# Patient Record
Sex: MALE | Race: WHITE | NOT HISPANIC OR LATINO | URBAN - METROPOLITAN AREA
[De-identification: names, ages, dates, MRNs, and addresses within clinical notes are randomized per-mention and may not be internally consistent; named-entity substitution may affect disease eponyms.]

---

## 2019-07-30 ENCOUNTER — EMERGENCY (EMERGENCY)
Facility: HOSPITAL | Age: 59
LOS: 1 days | Discharge: HOME | End: 2019-07-30
Attending: EMERGENCY MEDICINE
Payer: COMMERCIAL

## 2019-07-30 VITALS
WEIGHT: 255.07 LBS | OXYGEN SATURATION: 98 % | RESPIRATION RATE: 22 BRPM | SYSTOLIC BLOOD PRESSURE: 228 MMHG | HEIGHT: 70 IN | DIASTOLIC BLOOD PRESSURE: 117 MMHG | HEART RATE: 83 BPM | TEMPERATURE: 98 F

## 2019-07-30 VITALS
TEMPERATURE: 98 F | HEART RATE: 68 BPM | SYSTOLIC BLOOD PRESSURE: 188 MMHG | RESPIRATION RATE: 19 BRPM | OXYGEN SATURATION: 97 % | DIASTOLIC BLOOD PRESSURE: 84 MMHG

## 2019-07-30 LAB
ANION GAP SERPL CALC-SCNC: 14 MMOL/L — SIGNIFICANT CHANGE UP (ref 5–17)
BASOPHILS # BLD AUTO: 0.06 K/UL — SIGNIFICANT CHANGE UP (ref 0–0.2)
BASOPHILS NFR BLD AUTO: 0.6 % — SIGNIFICANT CHANGE UP (ref 0–2)
BUN SERPL-MCNC: 12 MG/DL — SIGNIFICANT CHANGE UP (ref 8–20)
CALCIUM SERPL-MCNC: 9.8 MG/DL — SIGNIFICANT CHANGE UP (ref 8.6–10.2)
CHLORIDE SERPL-SCNC: 104 MMOL/L — SIGNIFICANT CHANGE UP (ref 98–107)
CO2 SERPL-SCNC: 24 MMOL/L — SIGNIFICANT CHANGE UP (ref 22–29)
CREAT SERPL-MCNC: 0.8 MG/DL — SIGNIFICANT CHANGE UP (ref 0.5–1.3)
EOSINOPHIL # BLD AUTO: 0.26 K/UL — SIGNIFICANT CHANGE UP (ref 0–0.5)
EOSINOPHIL NFR BLD AUTO: 2.5 % — SIGNIFICANT CHANGE UP (ref 0–6)
GLUCOSE SERPL-MCNC: 116 MG/DL — HIGH (ref 70–115)
HCT VFR BLD CALC: 48.9 % — SIGNIFICANT CHANGE UP (ref 39–50)
HGB BLD-MCNC: 16.1 G/DL — SIGNIFICANT CHANGE UP (ref 13–17)
IMM GRANULOCYTES NFR BLD AUTO: 0.3 % — SIGNIFICANT CHANGE UP (ref 0–1.5)
LYMPHOCYTES # BLD AUTO: 1.92 K/UL — SIGNIFICANT CHANGE UP (ref 1–3.3)
LYMPHOCYTES # BLD AUTO: 18.8 % — SIGNIFICANT CHANGE UP (ref 13–44)
MCHC RBC-ENTMCNC: 28.9 PG — SIGNIFICANT CHANGE UP (ref 27–34)
MCHC RBC-ENTMCNC: 32.9 GM/DL — SIGNIFICANT CHANGE UP (ref 32–36)
MCV RBC AUTO: 87.8 FL — SIGNIFICANT CHANGE UP (ref 80–100)
MONOCYTES # BLD AUTO: 0.87 K/UL — SIGNIFICANT CHANGE UP (ref 0–0.9)
MONOCYTES NFR BLD AUTO: 8.5 % — SIGNIFICANT CHANGE UP (ref 2–14)
NEUTROPHILS # BLD AUTO: 7.09 K/UL — SIGNIFICANT CHANGE UP (ref 1.8–7.4)
NEUTROPHILS NFR BLD AUTO: 69.3 % — SIGNIFICANT CHANGE UP (ref 43–77)
PLATELET # BLD AUTO: 327 K/UL — SIGNIFICANT CHANGE UP (ref 150–400)
POTASSIUM SERPL-MCNC: 4.1 MMOL/L — SIGNIFICANT CHANGE UP (ref 3.5–5.3)
POTASSIUM SERPL-SCNC: 4.1 MMOL/L — SIGNIFICANT CHANGE UP (ref 3.5–5.3)
RBC # BLD: 5.57 M/UL — SIGNIFICANT CHANGE UP (ref 4.2–5.8)
RBC # FLD: 12.9 % — SIGNIFICANT CHANGE UP (ref 10.3–14.5)
SODIUM SERPL-SCNC: 142 MMOL/L — SIGNIFICANT CHANGE UP (ref 135–145)
WBC # BLD: 10.23 K/UL — SIGNIFICANT CHANGE UP (ref 3.8–10.5)
WBC # FLD AUTO: 10.23 K/UL — SIGNIFICANT CHANGE UP (ref 3.8–10.5)

## 2019-07-30 PROCEDURE — 96365 THER/PROPH/DIAG IV INF INIT: CPT

## 2019-07-30 PROCEDURE — 99284 EMERGENCY DEPT VISIT MOD MDM: CPT

## 2019-07-30 PROCEDURE — 96375 TX/PRO/DX INJ NEW DRUG ADDON: CPT

## 2019-07-30 PROCEDURE — 85027 COMPLETE CBC AUTOMATED: CPT

## 2019-07-30 PROCEDURE — 90471 IMMUNIZATION ADMIN: CPT

## 2019-07-30 PROCEDURE — 99284 EMERGENCY DEPT VISIT MOD MDM: CPT | Mod: 25

## 2019-07-30 PROCEDURE — 93005 ELECTROCARDIOGRAM TRACING: CPT

## 2019-07-30 PROCEDURE — 36415 COLL VENOUS BLD VENIPUNCTURE: CPT

## 2019-07-30 PROCEDURE — 80048 BASIC METABOLIC PNL TOTAL CA: CPT

## 2019-07-30 PROCEDURE — 73090 X-RAY EXAM OF FOREARM: CPT

## 2019-07-30 PROCEDURE — 73090 X-RAY EXAM OF FOREARM: CPT | Mod: 26,LT

## 2019-07-30 PROCEDURE — 93010 ELECTROCARDIOGRAM REPORT: CPT

## 2019-07-30 PROCEDURE — 90715 TDAP VACCINE 7 YRS/> IM: CPT

## 2019-07-30 RX ORDER — DIPHENHYDRAMINE HCL 50 MG
25 CAPSULE ORAL ONCE
Refills: 0 | Status: COMPLETED | OUTPATIENT
Start: 2019-07-30 | End: 2019-07-30

## 2019-07-30 RX ORDER — CIPROFLOXACIN LACTATE 400MG/40ML
1 VIAL (ML) INTRAVENOUS
Qty: 20 | Refills: 0
Start: 2019-07-30 | End: 2019-08-08

## 2019-07-30 RX ORDER — MORPHINE SULFATE 50 MG/1
8 CAPSULE, EXTENDED RELEASE ORAL ONCE
Refills: 0 | Status: DISCONTINUED | OUTPATIENT
Start: 2019-07-30 | End: 2019-07-30

## 2019-07-30 RX ORDER — LOSARTAN POTASSIUM 100 MG/1
50 TABLET, FILM COATED ORAL DAILY
Refills: 0 | Status: DISCONTINUED | OUTPATIENT
Start: 2019-07-30 | End: 2019-08-09

## 2019-07-30 RX ORDER — TETANUS TOXOID, REDUCED DIPHTHERIA TOXOID AND ACELLULAR PERTUSSIS VACCINE, ADSORBED 5; 2.5; 8; 8; 2.5 [IU]/.5ML; [IU]/.5ML; UG/.5ML; UG/.5ML; UG/.5ML
0.5 SUSPENSION INTRAMUSCULAR ONCE
Refills: 0 | Status: COMPLETED | OUTPATIENT
Start: 2019-07-30 | End: 2019-07-30

## 2019-07-30 RX ORDER — IBUPROFEN 200 MG
1 TABLET ORAL
Qty: 14 | Refills: 0
Start: 2019-07-30 | End: 2019-08-04

## 2019-07-30 RX ORDER — CIPROFLOXACIN LACTATE 400MG/40ML
400 VIAL (ML) INTRAVENOUS ONCE
Refills: 0 | Status: COMPLETED | OUTPATIENT
Start: 2019-07-30 | End: 2019-07-30

## 2019-07-30 RX ADMIN — MORPHINE SULFATE 8 MILLIGRAM(S): 50 CAPSULE, EXTENDED RELEASE ORAL at 14:48

## 2019-07-30 RX ADMIN — TETANUS TOXOID, REDUCED DIPHTHERIA TOXOID AND ACELLULAR PERTUSSIS VACCINE, ADSORBED 0.5 MILLILITER(S): 5; 2.5; 8; 8; 2.5 SUSPENSION INTRAMUSCULAR at 14:41

## 2019-07-30 RX ADMIN — Medication 200 MILLIGRAM(S): at 14:40

## 2019-07-30 RX ADMIN — Medication 25 MILLIGRAM(S): at 14:41

## 2019-07-30 RX ADMIN — MORPHINE SULFATE 8 MILLIGRAM(S): 50 CAPSULE, EXTENDED RELEASE ORAL at 14:15

## 2019-07-30 RX ADMIN — LOSARTAN POTASSIUM 50 MILLIGRAM(S): 100 TABLET, FILM COATED ORAL at 14:41

## 2019-07-30 RX ADMIN — Medication 400 MILLIGRAM(S): at 15:58

## 2019-07-30 NOTE — ED PROVIDER NOTE - ATTENDING CONTRIBUTION TO CARE
I, Raman Oro, personally saw the patient with the resident, and completed the key components of the history and physical exam. I then discussed the management plan with the resident.    57 yo M hx of HTN p/w left forearm laceration and erythema of left forearm when a "venomous cownose stingray" stung his arm when he was releasing it. He denies any piece left in his arm. He is being followed by his PMD regarding his BP mangement. He has been switched multiple times. No fever. Tetnus not up to date. On exam, 1 cm laceration of left forearm with surrounding erythema, full ROM, sensation intact, <2 second cap refil.

## 2019-07-30 NOTE — ED ADULT NURSE NOTE - OBJECTIVE STATEMENT
Patient presents to ED with c/o of left arm pain. Pt states " I was fishing and a sting ray stun me". Patient presents to ED A/Ox4, VSS, denies chest pain or sob.  Respirations are even and unlabored, lungs cta, +bowel x4 quads, abdomen soft, nontender/nondistended, skin w/d/i.

## 2019-07-30 NOTE — ED STATDOCS - PROGRESS NOTE DETAILS
59 y/o M pt with hx of HTN presents to ED with wife c/o laceration to left forearm s/p being stung by  "venomous cownose stingray" PTA. Pt states he was fishing was trying to let stingray free when it stung him. Per wife pt's blood pressure medication recently changed.   PE: 1cm laceration to left forearm with surrounding erythema with scant bleeding 59 y/o M pt with hx of HTN presents to ED with wife c/o laceration to left forearm s/p being stung by  "venomous cownose stingray" PTA. Pt states he was fishing was trying to let stingray free when it stung him. Per wife pt's blood pressure medication recently changed: cannot provide the details of the change.  Vital signs reviewed and BP markedly elevated.    PE: 1cm laceration to left forearm with surrounding erythema with scant bleeding

## 2019-07-30 NOTE — ED PROVIDER NOTE - PHYSICAL EXAMINATION
Constitutional: well-nourished male, uncomfortable, lying in bed.  Cardiovascular: RRR, S1/S2 present, no MRG.  No JVD, peripheral pulses 2+, no edema, cap refill <2 sec  Respiratory: CTAB, no increased work of breathing, no wheezes, rales, rhonchi.  Skin: 1.5 cm laceration to the left forearm, 8 cm area of erythema overlying.  No crepitus.  Neuro: CN 2-12 grossly intact, no focal deficits.

## 2019-07-30 NOTE — ED PROVIDER NOTE - CLINICAL SUMMARY MEDICAL DECISION MAKING FREE TEXT BOX
57 yo M hx of HTN p/w left forearm laceration and erythema of left forearm when a "venomous cownose stingray" stung his arm when he was releasing it.  Pain management and abx for infection, f/u with pcp

## 2019-07-30 NOTE — ED PROVIDER NOTE - OBJECTIVE STATEMENT
57 y/o M pt with hx of HTN presents to ED with wife c/o laceration to left forearm s/p being stung by  "venomous cownose stingray" PTA. Pt states he was fishing was trying to let stingray free when it stung him. Per wife pt's blood pressure medication recently changed.  Denies N/V, HA, CP/SOB, endorses severe pain on left forearm.

## 2019-07-30 NOTE — ED ADULT NURSE NOTE - CHPI ED NUR SYMPTOMS NEG
no diaphoresis/no rash/no decreased eating/drinking/no chills/no fussiness/not acting differently/no sleeping issues/no sadness/no arthralgias/no vomiting/no itching/no swelling of face, tongue/no blurred vision/no chest pain/no wheezing/no dizziness/no abdominal pain/no difficulty breathing

## 2019-07-30 NOTE — ED PROVIDER NOTE - CARE PLAN
Principal Discharge DX:	Puncture wound of arm, left, complicated, initial encounter  Secondary Diagnosis:	HTN (hypertension) with goal to be determined

## 2019-07-30 NOTE — ED PROVIDER NOTE - NS ED ROS FT
Cardiovascular: denies fever, chest pain, claudication, leg swelling, orthopnea, palpitations  Respiratory: denies cough, shortness of breath Constitutional: denies diaphoresis, chills, fever  HENT: denies congestion, headaches, sore throat  Eyes: denies blurred vision, double vision  Cardiovascular: denies chest pain, leg swelling, orthopnea, palpitations  Respiratory: denies cough, shortness of breath, wheezing  Gastrointestinal: denies abdominal pain, constipation, diarrhea, heartburn, nausea, vomiting  Genitourinary: denies dysuria, flank pain, frequency, urgency, hematuria  Allergic: denies  Neurologic: denies dizziness, focal weakness, altered level of consciousness, seizures, sensory change, speech change, numbness, tingling, tremor
